# Patient Record
Sex: MALE | Race: WHITE | NOT HISPANIC OR LATINO | Employment: FULL TIME | URBAN - METROPOLITAN AREA
[De-identification: names, ages, dates, MRNs, and addresses within clinical notes are randomized per-mention and may not be internally consistent; named-entity substitution may affect disease eponyms.]

---

## 2017-01-18 ENCOUNTER — TELEPHONE (OUTPATIENT)
Dept: NEUROLOGY | Facility: CLINIC | Age: 37
End: 2017-01-18

## 2017-01-18 NOTE — TELEPHONE ENCOUNTER
----- Message from Scarlett Boyce sent at 1/18/2017 11:32 AM CST -----  Contact: pt [phone]  Pt states he needs another office visit authorization sent [facility].pls call

## 2017-01-18 NOTE — TELEPHONE ENCOUNTER
I have attempted to contact this patient and there was no answer and an unidentifiable voice mail therefore I did not leave a message.

## 2017-02-10 ENCOUNTER — TELEPHONE (OUTPATIENT)
Dept: NEUROLOGY | Facility: CLINIC | Age: 37
End: 2017-02-10

## 2017-02-10 NOTE — TELEPHONE ENCOUNTER
----- Message from Jaycob Bhandari sent at 2/10/2017 11:00 AM CST -----  Contact: Self [phone]  Patient is calling to get an update on the authorization from [facility] call

## 2017-02-10 NOTE — TELEPHONE ENCOUNTER
I have spoken to this patient and explained to him that what he is requesting is not in my scope of practice but I will do all I can to assist him and if he could get me any type of information or a phone number to work with he states he will attempt to get this handled on his end and get back with me if he has any further concerns

## 2017-03-08 ENCOUNTER — OFFICE VISIT (OUTPATIENT)
Dept: NEUROLOGY | Facility: CLINIC | Age: 37
End: 2017-03-08
Payer: OTHER GOVERNMENT

## 2017-03-08 VITALS — BODY MASS INDEX: 24.4 KG/M2 | HEIGHT: 72 IN | WEIGHT: 180.13 LBS

## 2017-03-08 DIAGNOSIS — R20.2 NUMBNESS AND TINGLING OF LEFT ARM AND LEG: ICD-10-CM

## 2017-03-08 DIAGNOSIS — R20.0 NUMBNESS AND TINGLING OF LEFT ARM AND LEG: ICD-10-CM

## 2017-03-08 DIAGNOSIS — R20.0 LEFT FACIAL NUMBNESS: Primary | ICD-10-CM

## 2017-03-08 PROCEDURE — 99999 PR PBB SHADOW E&M-EST. PATIENT-LVL III: CPT | Mod: PBBFAC,,, | Performed by: NEUROMUSCULOSKELETAL MEDICINE & OMM

## 2017-03-08 PROCEDURE — 99214 OFFICE O/P EST MOD 30 MIN: CPT | Mod: S$PBB,,, | Performed by: NEUROMUSCULOSKELETAL MEDICINE & OMM

## 2017-03-08 PROCEDURE — 99213 OFFICE O/P EST LOW 20 MIN: CPT | Mod: PBBFAC,PO | Performed by: NEUROMUSCULOSKELETAL MEDICINE & OMM

## 2017-03-08 RX ORDER — ZOLPIDEM TARTRATE 5 MG/1
TABLET ORAL
COMMUNITY
Start: 2017-02-21

## 2017-03-08 NOTE — PROGRESS NOTES
Progress Notes       This history is dictated on [employer] word recognition program. There are word recognition mistakes that are occasionally missed on review.      Patient's past medical history, testing and medications reviewed in [facility]  Social History : Patient is a [employer] . He is  with 2 girls and a third child on the way . Patient is active [employer] and moved [place] one year ago. Active athletically with jogging 10 miles per week and weight lifting once or twice per week.      Present Medical History: Patient presents for follow-up after having been seen by 2 neurosurgeons for his Arnold-Chiari.  One of the neurosurgeons relates the symptoms to the Chiari malformation while the other one says that he doesn't think that the symptoms of left face arm and leg numbness are related.  Patient has been going to physical therapy for the last 4 months for low back injury after lifting weights.  He is back to jogging at the present time and seems to be doing better.  He has no other further symptoms that could be related to spinal cord.    Previous note: 11-30-16: Patient presents for urgent follow up due to left face going numb over the last several days. He complains of numbness goes into the upper arm. He has associated pressure headaches into the back of the head. He is concerned about a brain tumor or his Arnold-Chiari malformation. Neurosurgery has cleared him of surgical intervention for the Chiari. His last MRI 7 months ago was normal other than the mild Chiari malformation.  Previous note: 11-14-16: Patient presents for follow-up for left arm and leg numbness. EMG nerve conduction study 5-17-16 was normal. MRI cervical spine was negative for any significant disc disease; lumbar disc disease was noted on lumbar MRI scan. MRI brain shows small He saw a neurosurgeon who did not feel he had any evidence of surgical disease MRI brain shows minimal evidence of Arnold-Chiari malformation.  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  Patient continues to do well with physical therapy with some intermittent discomfort in the left arm and left leg described as numbness or tingling. He is able to function with normal activity including exercise.     Gen. Appearance: Well-developed with no obvious deformities  Carotid arteries symmetrical pulses  Peripheral vascular shows symmetrical pulses with no obvious edema or tenderness  Neurological Exam:  Mental status-alert and oriented to person, place, and time; attention span and concentration is good. Fund of knowledge-patient is aware of current events and able to give detailed history of the current problem.recent and remote memory seems intact. Language function is normal with no evidence of aphasia      Cranial nerves:Visual acuity to hand chart -normal; visual fields to confrontation normal;pupils were equal and reactive to light ; funduscopic examination was normal with sharp disc margins. external ocular movements were full with no nystagmus. Facial sensation to pinprick : normal ; corneal reflexes intact; Facial muscles were symmetrical. Hearing is unimpaired symmetrical finger rub; Tongue movements - normal ; palate movements - normal ;Swallowing unimpaired. Shoulder shrug was intact with good strength Speech was normal      Motor examination: Upper : normal Lower extremities - Normal and symmetrical;muscle tone was normal ; right-handed  Sensory examination:Upper; normal pinprick and soft touch ; lower extremities - normal and symmetrical. Vibration sense: normal for age :15-20 seconds @ toes  Deep tendon reflexes: upper extremities :1-2+ symmetrical ; lower extremities KJ- 1-2 +; AJ - 1-2+ Both plantar responses were flexor  Cerebellar examination upper: Normal finger to nose and rapid alternating movements  Gait: Steady with no ataxia; heel and toe walk normal  Romberg test: negative Tandem gait:  Normal    Involuntary movements: Negative  Cervical examination: Full range of motion with no pain Cervical tenderness :negative  Lumbar examination: Low back tenderness-negative Sciatic notch tenderness-negative Straight leg raising test-negative      Impression: Left facial numbness Left arm and leg numbness; mild Arnold-Chiari malformation; minimal cervical and lumbar disc disease   Recommendations/plan: Long discussion with the patient in terms of differential diagnosis of the numbness of the face , arm, leg probably Chiari related.   In the presence of a normal neurologic exam unlikely and absence of MS lesions on brain, cervical, thoracic with doubt multiple sclerosis at this point..  Discussed continued clinical follow-up unless clinical pattern changes.  Follow-up 6 months .